# Patient Record
Sex: FEMALE | Race: WHITE | URBAN - METROPOLITAN AREA
[De-identification: names, ages, dates, MRNs, and addresses within clinical notes are randomized per-mention and may not be internally consistent; named-entity substitution may affect disease eponyms.]

---

## 2017-01-12 ENCOUNTER — RESULTS ONLY (OUTPATIENT)
Dept: OTHER | Facility: CLINIC | Age: 62
End: 2017-01-12

## 2017-01-12 LAB
PROTOCOL CUTOFF: NORMAL
UNACCEPTABLE ANTIGEN: NORMAL

## 2017-09-19 ENCOUNTER — TELEPHONE (OUTPATIENT)
Dept: TRANSPLANT | Facility: CLINIC | Age: 62
End: 2017-09-19

## 2018-08-16 ENCOUNTER — TELEPHONE (OUTPATIENT)
Dept: TRANSPLANT | Facility: CLINIC | Age: 63
End: 2018-08-16

## 2018-08-16 NOTE — LETTER
August 16, 2018    Kena Armendariz  [address]  [address]  [place] [address]          Dear Ms. Armendariz,    This letter is to inform you that you remain listed as inactive status (on hold) on the pancreas transplant wait lis at the [facility] ([facility]) for 12 years.  Attempts have been made to reach you by telephone to review your status, but we have been unsuccessful in getting  In touch with you because  we do not have a current phone number for you.        At your earliest convenience, please contact me at [phone] to let me know whether you are still interested in having a transplant.  If we do not hear from your within 60 days of the date of this letter, your case will be reviewed for removal from the wait list.  This is not intended to be punitive; it is simply a part of our effort to improve wait-list efficiency to provide better care for all of our patients.          Thank you for your attention in this matter!   We look forward to hearing from you.          Sincerely,      Elba Stockton, RN, BSN  Transplant Coordinator

## 2018-08-16 NOTE — TELEPHONE ENCOUNTER
"Recorded message, \"We're sorry, but the number you have reached has been disconnected.  If you feel this is in error, please try your call again.\"  "

## 2018-10-24 ENCOUNTER — COMMITTEE REVIEW (OUTPATIENT)
Dept: TRANSPLANT | Facility: CLINIC | Age: 63
End: 2018-10-24

## 2018-10-24 NOTE — COMMITTEE REVIEW
Abdominal Patient Discussion Note Transplant Coordinator: Elba Stockton  Transplant Surgeon:       Referring Physician:     Committee Review Members:  Nutrition Sari Bowles, RD   Pharmacy Nazia Heart, [facility]    - Clinical LOVELY Drake, Catherine Acosta, [facility]   Transplant Nathaniel Anderson MD, Elba Stockton, HEIDY, Terry Bay MD, Melinda Bethea, ARTHUR, Kriss Elder RN, Carlitos Wilson MD, Meche Reynoso RN, Scott Bowden MD       Additional Discussion Notes and Findings:   Patient has remained listed inactive status on the pancreas wait list for 12 years.  Attempts have been made to call the patient and patient sent letter requesting she contact her coordinator within 60 days of the date of the letter.  Patient has not responded to the letter.  Team recommends patient be removed from the pancreas wait list.

## 2018-10-25 ENCOUNTER — DOCUMENTATION ONLY (OUTPATIENT)
Dept: TRANSPLANT | Facility: CLINIC | Age: 63
End: 2018-10-25

## 2018-10-31 ENCOUNTER — TEAM CONFERENCE (OUTPATIENT)
Dept: TRANSPLANT | Facility: CLINIC | Age: 63
End: 2018-10-31

## 2018-11-19 NOTE — TELEPHONE ENCOUNTER
On 10/24/2018 Transplant Selection Committee (all disciplines present), determined patient should be removed from the pancreas waitlist as we have been unable to contact patient.  Patient's name removed from the pancreas waitlist on 10/25/2018.